# Patient Record
Sex: MALE | Race: BLACK OR AFRICAN AMERICAN | NOT HISPANIC OR LATINO | URBAN - METROPOLITAN AREA
[De-identification: names, ages, dates, MRNs, and addresses within clinical notes are randomized per-mention and may not be internally consistent; named-entity substitution may affect disease eponyms.]

---

## 2024-02-20 ENCOUNTER — HOSPITAL ENCOUNTER (EMERGENCY)
Facility: HOSPITAL | Age: 49
Discharge: HOME | End: 2024-02-20
Payer: MEDICAID

## 2024-02-20 PROCEDURE — 4500999001 HC ED NO CHARGE

## 2024-02-20 NOTE — ED NOTES
Attempted to find pt once more without success. Called listed phone number without answer.      Colby Mari RN  02/20/24 [id number]

## 2024-02-20 NOTE — ED NOTES
Unable to find pt in waiting room x2. Called both listed phone numbers without answer.      Colby Mari RN  02/20/24 [id number]